# Patient Record
Sex: MALE | Race: WHITE | NOT HISPANIC OR LATINO | Employment: OTHER | URBAN - METROPOLITAN AREA
[De-identification: names, ages, dates, MRNs, and addresses within clinical notes are randomized per-mention and may not be internally consistent; named-entity substitution may affect disease eponyms.]

---

## 2024-08-09 ENCOUNTER — LAB (OUTPATIENT)
Dept: LAB | Facility: HOSPITAL | Age: 84
End: 2024-08-09
Payer: MEDICAID

## 2024-08-09 ENCOUNTER — OFFICE VISIT (OUTPATIENT)
Dept: FAMILY MEDICINE CLINIC | Facility: CLINIC | Age: 84
End: 2024-08-09
Payer: MEDICAID

## 2024-08-09 VITALS
BODY MASS INDEX: 25.29 KG/M2 | WEIGHT: 151.8 LBS | HEART RATE: 70 BPM | TEMPERATURE: 97.9 F | OXYGEN SATURATION: 92 % | HEIGHT: 65 IN | DIASTOLIC BLOOD PRESSURE: 74 MMHG | SYSTOLIC BLOOD PRESSURE: 146 MMHG

## 2024-08-09 DIAGNOSIS — I10 HYPERTENSION, UNSPECIFIED TYPE: ICD-10-CM

## 2024-08-09 DIAGNOSIS — R35.0 URINARY FREQUENCY: ICD-10-CM

## 2024-08-09 DIAGNOSIS — D64.9 ANEMIA, UNSPECIFIED TYPE: ICD-10-CM

## 2024-08-09 DIAGNOSIS — D64.9 ANEMIA, UNSPECIFIED TYPE: Primary | ICD-10-CM

## 2024-08-09 DIAGNOSIS — K21.9 GASTROESOPHAGEAL REFLUX DISEASE, UNSPECIFIED WHETHER ESOPHAGITIS PRESENT: ICD-10-CM

## 2024-08-09 DIAGNOSIS — E78.5 HYPERLIPIDEMIA, UNSPECIFIED HYPERLIPIDEMIA TYPE: ICD-10-CM

## 2024-08-09 LAB
ALBUMIN SERPL-MCNC: 4.2 G/DL (ref 3.5–5.2)
ALBUMIN/GLOB SERPL: 1.4 G/DL
ALP SERPL-CCNC: 74 U/L (ref 39–117)
ALT SERPL W P-5'-P-CCNC: 20 U/L (ref 1–41)
ANION GAP SERPL CALCULATED.3IONS-SCNC: 11.6 MMOL/L (ref 5–15)
AST SERPL-CCNC: 23 U/L (ref 1–40)
BASOPHILS # BLD AUTO: 0.19 10*3/MM3 (ref 0–0.2)
BASOPHILS NFR BLD AUTO: 1.9 % (ref 0–1.5)
BILIRUB SERPL-MCNC: 0.3 MG/DL (ref 0–1.2)
BILIRUB UR QL STRIP: NEGATIVE
BUN SERPL-MCNC: 25 MG/DL (ref 8–23)
BUN/CREAT SERPL: 16.8 (ref 7–25)
CALCIUM SPEC-SCNC: 10 MG/DL (ref 8.6–10.5)
CHLORIDE SERPL-SCNC: 106 MMOL/L (ref 98–107)
CHOLEST SERPL-MCNC: 160 MG/DL (ref 0–200)
CLARITY UR: CLEAR
CO2 SERPL-SCNC: 22.4 MMOL/L (ref 22–29)
COLOR UR: YELLOW
CREAT SERPL-MCNC: 1.49 MG/DL (ref 0.76–1.27)
DEPRECATED RDW RBC AUTO: 57.4 FL (ref 37–54)
EGFRCR SERPLBLD CKD-EPI 2021: 46.3 ML/MIN/1.73
EOSINOPHIL # BLD AUTO: 0.3 10*3/MM3 (ref 0–0.4)
EOSINOPHIL NFR BLD AUTO: 2.9 % (ref 0.3–6.2)
ERYTHROCYTE [DISTWIDTH] IN BLOOD BY AUTOMATED COUNT: 21.1 % (ref 12.3–15.4)
GLOBULIN UR ELPH-MCNC: 3 GM/DL
GLUCOSE SERPL-MCNC: 89 MG/DL (ref 65–99)
GLUCOSE UR STRIP-MCNC: NEGATIVE MG/DL
HCT VFR BLD AUTO: 34.1 % (ref 37.5–51)
HDLC SERPL-MCNC: 35 MG/DL (ref 40–60)
HGB BLD-MCNC: 10.7 G/DL (ref 13–17.7)
HGB UR QL STRIP.AUTO: NEGATIVE
HOLD SPECIMEN: NORMAL
IMM GRANULOCYTES # BLD AUTO: 0.12 10*3/MM3 (ref 0–0.05)
IMM GRANULOCYTES NFR BLD AUTO: 1.2 % (ref 0–0.5)
KETONES UR QL STRIP: ABNORMAL
LDLC SERPL CALC-MCNC: 115 MG/DL (ref 0–100)
LDLC/HDLC SERPL: 3.3 {RATIO}
LEUKOCYTE ESTERASE UR QL STRIP.AUTO: NEGATIVE
LYMPHOCYTES # BLD AUTO: 1.33 10*3/MM3 (ref 0.7–3.1)
LYMPHOCYTES NFR BLD AUTO: 13 % (ref 19.6–45.3)
MAGNESIUM SERPL-MCNC: 2.2 MG/DL (ref 1.6–2.4)
MCH RBC QN AUTO: 24.4 PG (ref 26.6–33)
MCHC RBC AUTO-ENTMCNC: 31.4 G/DL (ref 31.5–35.7)
MCV RBC AUTO: 77.9 FL (ref 79–97)
MONOCYTES # BLD AUTO: 1.01 10*3/MM3 (ref 0.1–0.9)
MONOCYTES NFR BLD AUTO: 9.9 % (ref 5–12)
NEUTROPHILS NFR BLD AUTO: 7.26 10*3/MM3 (ref 1.7–7)
NEUTROPHILS NFR BLD AUTO: 71.1 % (ref 42.7–76)
NITRITE UR QL STRIP: NEGATIVE
NRBC BLD AUTO-RTO: 0 /100 WBC (ref 0–0.2)
PH UR STRIP.AUTO: 5.5 [PH] (ref 5–8)
PLATELET # BLD AUTO: 813 10*3/MM3 (ref 140–450)
PMV BLD AUTO: 9.4 FL (ref 6–12)
POTASSIUM SERPL-SCNC: 5.1 MMOL/L (ref 3.5–5.2)
PROT SERPL-MCNC: 7.2 G/DL (ref 6–8.5)
PROT UR QL STRIP: ABNORMAL
RBC # BLD AUTO: 4.38 10*6/MM3 (ref 4.14–5.8)
SODIUM SERPL-SCNC: 140 MMOL/L (ref 136–145)
SP GR UR STRIP: 1.02 (ref 1–1.03)
TRIGL SERPL-MCNC: 48 MG/DL (ref 0–150)
UROBILINOGEN UR QL STRIP: ABNORMAL
VIT B12 BLD-MCNC: 1202 PG/ML (ref 211–946)
VLDLC SERPL-MCNC: 10 MG/DL (ref 5–40)
WBC NRBC COR # BLD AUTO: 10.21 10*3/MM3 (ref 3.4–10.8)

## 2024-08-09 PROCEDURE — 1126F AMNT PAIN NOTED NONE PRSNT: CPT | Performed by: STUDENT IN AN ORGANIZED HEALTH CARE EDUCATION/TRAINING PROGRAM

## 2024-08-09 PROCEDURE — 83735 ASSAY OF MAGNESIUM: CPT

## 2024-08-09 PROCEDURE — 80061 LIPID PANEL: CPT

## 2024-08-09 PROCEDURE — 99204 OFFICE O/P NEW MOD 45 MIN: CPT | Performed by: STUDENT IN AN ORGANIZED HEALTH CARE EDUCATION/TRAINING PROGRAM

## 2024-08-09 PROCEDURE — 3078F DIAST BP <80 MM HG: CPT | Performed by: STUDENT IN AN ORGANIZED HEALTH CARE EDUCATION/TRAINING PROGRAM

## 2024-08-09 PROCEDURE — 82607 VITAMIN B-12: CPT

## 2024-08-09 PROCEDURE — 1159F MED LIST DOCD IN RCRD: CPT | Performed by: STUDENT IN AN ORGANIZED HEALTH CARE EDUCATION/TRAINING PROGRAM

## 2024-08-09 PROCEDURE — 85025 COMPLETE CBC W/AUTO DIFF WBC: CPT

## 2024-08-09 PROCEDURE — 80053 COMPREHEN METABOLIC PANEL: CPT

## 2024-08-09 PROCEDURE — 1160F RVW MEDS BY RX/DR IN RCRD: CPT | Performed by: STUDENT IN AN ORGANIZED HEALTH CARE EDUCATION/TRAINING PROGRAM

## 2024-08-09 PROCEDURE — 36415 COLL VENOUS BLD VENIPUNCTURE: CPT

## 2024-08-09 PROCEDURE — 81003 URINALYSIS AUTO W/O SCOPE: CPT

## 2024-08-09 PROCEDURE — 3077F SYST BP >= 140 MM HG: CPT | Performed by: STUDENT IN AN ORGANIZED HEALTH CARE EDUCATION/TRAINING PROGRAM

## 2024-08-09 RX ORDER — ALUMINUM HYDROXIDE 320 MG/5ML
5 LIQUID ORAL
COMMUNITY
End: 2024-08-09

## 2024-08-09 RX ORDER — OMEPRAZOLE 20 MG/1
CAPSULE, DELAYED RELEASE ORAL
COMMUNITY
Start: 2024-07-10 | End: 2024-08-09 | Stop reason: SDUPTHER

## 2024-08-09 RX ORDER — LISINOPRIL 10 MG/1
1 TABLET ORAL DAILY
COMMUNITY
Start: 2024-05-20

## 2024-08-09 RX ORDER — OMEPRAZOLE 20 MG/1
20 CAPSULE, DELAYED RELEASE ORAL DAILY
Qty: 90 CAPSULE | Refills: 1 | Status: SHIPPED | OUTPATIENT
Start: 2024-08-09

## 2024-08-09 RX ORDER — FERROUS SULFATE 325(65) MG
1 TABLET ORAL EVERY 12 HOURS SCHEDULED
COMMUNITY
Start: 2024-06-04

## 2024-08-09 RX ORDER — DOXAZOSIN 2 MG/1
1 TABLET ORAL DAILY
COMMUNITY
Start: 2024-06-04 | End: 2024-08-09

## 2024-08-09 RX ORDER — TAMSULOSIN HYDROCHLORIDE 0.4 MG/1
1 CAPSULE ORAL DAILY
Qty: 90 CAPSULE | Refills: 1 | Status: SHIPPED | OUTPATIENT
Start: 2024-08-09

## 2024-08-09 RX ORDER — DOXAZOSIN MESYLATE 1 MG/1
1 TABLET ORAL
COMMUNITY
Start: 2024-05-30 | End: 2024-08-09

## 2024-08-09 RX ORDER — ATORVASTATIN CALCIUM 10 MG/1
10 TABLET, FILM COATED ORAL EVERY EVENING
COMMUNITY
Start: 2024-07-11

## 2024-08-09 NOTE — ASSESSMENT & PLAN NOTE
- Patient reports a history of high cholesterol, has not been taking his atorvastatin 10 mg daily because it made his head hurt  - Obtaining lipid panel today, will follow-up on results and advise further

## 2024-08-09 NOTE — ASSESSMENT & PLAN NOTE
- Patient has a diagnosis of hypertension, he has not been taking his lisinopril because his blood pressure has been normal, it is normal for age today, goal is to be less than 150/90  - I think it is reasonable to hold off on lisinopril for now and just to monitor  - Obtaining CMP today

## 2024-08-09 NOTE — PROGRESS NOTES
"    Office Note     Name: Royce Preciado    : 1940     MRN: [id number]     Chief Complaint  Establish Care    Subjective     History of Present Illness:  Royce Preciado is a 84 y.o. male who presents today to establish care and to discuss his chronic medical conditions.  Patient was previously seen at [facility].    GERD: Patient is currently on omeprazole 20 mg daily.  He reports compliance with this medicine.  He does report that he has been on it for a number of years and it does help control his symptoms.    Hypertension: Patient currently on lisinopril 10 mg, she does not take on a daily basis.  The last 1 that he took it was 2 weeks ago.  He reports that his blood pressure has been doing fine at home.  He checks it on a daily basis and it is usually in the 120s/70s SBP.  He has a list with him today.  He denies any chest pain.    HLD: Patient was previously on atorvastatin 10 mg daily but stopped taking this medicine because it made his head hurt.  When he stopped taking the medicine, has had stopped hurting.  He is okay with getting his cholesterol checked today.    Patient does have urinary frequency, usually at night.  He can get up 2-3 times to go to the bathroom.  He does feel like he is emptying completely.  He is willing to start trial on tamsulosin.    Patient was told that he has critically low hemoglobin.  He has been on iron supplementation for more than 6 weeks.  He is okay with getting his repeat CBC today.    Patient does have diarrhea every couple of days.  He does have some bloating associated with kiwi and milk use but he really likes both.            Objective     History reviewed. No pertinent past medical history.  History reviewed. No pertinent surgical history.  History reviewed. No pertinent family history.    Vital Signs  /74   Pulse 70   Temp 97.9 °F (36.6 °C) (Temporal)   Ht 165.1 cm (65\")   Wt 68.9 kg (151 lb 12.8 oz)   SpO2 92%   BMI 25.26 kg/m²   Estimated " "body mass index is 25.26 kg/m² as calculated from the following:    Height as of this encounter: 165.1 cm (65\").    Weight as of this encounter: 68.9 kg (151 lb 12.8 oz).    Physical Exam  Vitals reviewed.   Constitutional:       Appearance: Normal appearance.   HENT:      Head: Normocephalic.      Right Ear: Tympanic membrane normal.      Left Ear: Tympanic membrane normal.      Nose: Nose normal.      Mouth/Throat:      Mouth: Mucous membranes are moist.   Eyes:      Conjunctiva/sclera: Conjunctivae normal.   Cardiovascular:      Rate and Rhythm: Normal rate and regular rhythm.   Pulmonary:      Effort: Pulmonary effort is normal.      Breath sounds: Normal breath sounds.   Abdominal:      General: Abdomen is flat.      Palpations: Abdomen is soft.   Skin:     General: Skin is warm.   Neurological:      Mental Status: He is alert.   Psychiatric:         Mood and Affect: Mood normal.         Behavior: Behavior normal.                   Assessment and Plan     Diagnoses and all orders for this visit:    1. Anemia, unspecified type (Primary)  Assessment & Plan:  - Patient said that he has had low hemoglobin  - He has been on iron supplementation for 6 weeks  - Obtaining repeat CBC, will follow-up on results and advise further    Orders:  -     CBC & Differential; Future  -     Iron and TIBC; Future    2. Hyperlipidemia, unspecified hyperlipidemia type  Assessment & Plan:  - Patient reports a history of high cholesterol, has not been taking his atorvastatin 10 mg daily because it made his head hurt  - Obtaining lipid panel today, will follow-up on results and advise further    Orders:  -     Lipid Panel; Future    3. Hypertension, unspecified type  Assessment & Plan:  - Patient has a diagnosis of hypertension, he has not been taking his lisinopril because his blood pressure has been normal, it is normal for age today, goal is to be less than 150/90  - I think it is reasonable to hold off on lisinopril for now and just " to monitor  - Obtaining CMP today    Orders:  -     Comprehensive Metabolic Panel; Future    4. Urinary frequency  Assessment & Plan:  - Obtaining UA to rule out possible infection, follow-up on those results and advise further  - Could be secondary to BPH  - We will start tamsulosin daily    Orders:  -     Urinalysis With Culture If Indicated -; Future  -     tamsulosin (FLOMAX) 0.4 MG capsule 24 hr capsule; Take 1 capsule by mouth Daily.  Dispense: 90 capsule; Refill: 1    5. Gastroesophageal reflux disease, unspecified whether esophagitis present  Assessment & Plan:  - Controlled  - Continue omeprazole 20 mg daily  - Obtaining electrolytes, B12 and magnesium level today    Orders:  -     omeprazole (priLOSEC) 20 MG capsule; Take 1 capsule by mouth Daily.  Dispense: 90 capsule; Refill: 1  -     Magnesium; Future  -     Vitamin B12; Future        Follow Up  Return in about 6 months (around 2/9/2025) for HTN, GERD.    Marlena Mccall MD

## 2024-08-09 NOTE — ASSESSMENT & PLAN NOTE
- Obtaining UA to rule out possible infection, follow-up on those results and advise further  - Could be secondary to BPH  - We will start tamsulosin daily

## 2024-08-09 NOTE — ASSESSMENT & PLAN NOTE
- Patient said that he has had low hemoglobin  - He has been on iron supplementation for 6 weeks  - Obtaining repeat CBC, will follow-up on results and advise further

## 2024-08-09 NOTE — ASSESSMENT & PLAN NOTE
- Controlled  - Continue omeprazole 20 mg daily  - Obtaining electrolytes, B12 and magnesium level today

## 2024-08-20 ENCOUNTER — TELEPHONE (OUTPATIENT)
Dept: FAMILY MEDICINE CLINIC | Facility: CLINIC | Age: 84
End: 2024-08-20
Payer: MEDICAID

## 2024-08-20 DIAGNOSIS — E78.5 HYPERLIPIDEMIA, UNSPECIFIED HYPERLIPIDEMIA TYPE: ICD-10-CM

## 2024-08-20 RX ORDER — FERROUS SULFATE 325(65) MG
1 TABLET ORAL
Qty: 30 TABLET | Refills: 1 | Status: SHIPPED | OUTPATIENT
Start: 2024-08-20

## 2024-08-20 NOTE — TELEPHONE ENCOUNTER
Can someone please call [facility] in [place] and ask for his most recent several office notes and labs?  I need those records ASAP.

## 2024-09-10 ENCOUNTER — OFFICE VISIT (OUTPATIENT)
Dept: FAMILY MEDICINE CLINIC | Facility: CLINIC | Age: 84
End: 2024-09-10
Payer: MEDICAID

## 2024-09-10 ENCOUNTER — LAB (OUTPATIENT)
Dept: LAB | Facility: HOSPITAL | Age: 84
End: 2024-09-10
Payer: MEDICAID

## 2024-09-10 VITALS
BODY MASS INDEX: 25.59 KG/M2 | TEMPERATURE: 97.8 F | OXYGEN SATURATION: 95 % | HEIGHT: 65 IN | DIASTOLIC BLOOD PRESSURE: 76 MMHG | WEIGHT: 153.6 LBS | HEART RATE: 76 BPM | SYSTOLIC BLOOD PRESSURE: 130 MMHG

## 2024-09-10 DIAGNOSIS — R79.89 ELEVATED SERUM CREATININE: ICD-10-CM

## 2024-09-10 DIAGNOSIS — I10 HYPERTENSION, UNSPECIFIED TYPE: ICD-10-CM

## 2024-09-10 DIAGNOSIS — D64.9 ANEMIA, UNSPECIFIED TYPE: ICD-10-CM

## 2024-09-10 DIAGNOSIS — D64.9 ANEMIA, UNSPECIFIED TYPE: Primary | ICD-10-CM

## 2024-09-10 LAB
ALBUMIN SERPL-MCNC: 4.3 G/DL (ref 3.5–5.2)
ALBUMIN/GLOB SERPL: 1.7 G/DL
ALP SERPL-CCNC: 65 U/L (ref 39–117)
ALT SERPL W P-5'-P-CCNC: 17 U/L (ref 1–41)
ANION GAP SERPL CALCULATED.3IONS-SCNC: 8.6 MMOL/L (ref 5–15)
AST SERPL-CCNC: 24 U/L (ref 1–40)
BASOPHILS # BLD AUTO: 0.17 10*3/MM3 (ref 0–0.2)
BASOPHILS NFR BLD AUTO: 2.1 % (ref 0–1.5)
BILIRUB SERPL-MCNC: 0.3 MG/DL (ref 0–1.2)
BUN SERPL-MCNC: 22 MG/DL (ref 8–23)
BUN/CREAT SERPL: 16.7 (ref 7–25)
CALCIUM SPEC-SCNC: 9.7 MG/DL (ref 8.6–10.5)
CHLORIDE SERPL-SCNC: 109 MMOL/L (ref 98–107)
CO2 SERPL-SCNC: 20.4 MMOL/L (ref 22–29)
CREAT SERPL-MCNC: 1.32 MG/DL (ref 0.76–1.27)
DEPRECATED RDW RBC AUTO: 54.5 FL (ref 37–54)
EGFRCR SERPLBLD CKD-EPI 2021: 53.5 ML/MIN/1.73
EOSINOPHIL # BLD AUTO: 0.26 10*3/MM3 (ref 0–0.4)
EOSINOPHIL NFR BLD AUTO: 3.2 % (ref 0.3–6.2)
ERYTHROCYTE [DISTWIDTH] IN BLOOD BY AUTOMATED COUNT: 19.3 % (ref 12.3–15.4)
FERRITIN SERPL-MCNC: 50.2 NG/ML (ref 30–400)
GLOBULIN UR ELPH-MCNC: 2.6 GM/DL
GLUCOSE SERPL-MCNC: 93 MG/DL (ref 65–99)
HCT VFR BLD AUTO: 34.1 % (ref 37.5–51)
HGB BLD-MCNC: 10.8 G/DL (ref 13–17.7)
IMM GRANULOCYTES # BLD AUTO: 0.12 10*3/MM3 (ref 0–0.05)
IMM GRANULOCYTES NFR BLD AUTO: 1.5 % (ref 0–0.5)
IRON 24H UR-MRATE: 47 MCG/DL (ref 59–158)
IRON SATN MFR SERPL: 12 % (ref 20–50)
LYMPHOCYTES # BLD AUTO: 1.13 10*3/MM3 (ref 0.7–3.1)
LYMPHOCYTES NFR BLD AUTO: 13.7 % (ref 19.6–45.3)
MCH RBC QN AUTO: 25.4 PG (ref 26.6–33)
MCHC RBC AUTO-ENTMCNC: 31.7 G/DL (ref 31.5–35.7)
MCV RBC AUTO: 80.2 FL (ref 79–97)
MONOCYTES # BLD AUTO: 0.88 10*3/MM3 (ref 0.1–0.9)
MONOCYTES NFR BLD AUTO: 10.7 % (ref 5–12)
NEUTROPHILS NFR BLD AUTO: 5.66 10*3/MM3 (ref 1.7–7)
NEUTROPHILS NFR BLD AUTO: 68.8 % (ref 42.7–76)
NRBC BLD AUTO-RTO: 0.4 /100 WBC (ref 0–0.2)
PLATELET # BLD AUTO: 775 10*3/MM3 (ref 140–450)
PMV BLD AUTO: 9.2 FL (ref 6–12)
POTASSIUM SERPL-SCNC: 4.8 MMOL/L (ref 3.5–5.2)
PROT SERPL-MCNC: 6.9 G/DL (ref 6–8.5)
RBC # BLD AUTO: 4.25 10*6/MM3 (ref 4.14–5.8)
SODIUM SERPL-SCNC: 138 MMOL/L (ref 136–145)
TIBC SERPL-MCNC: 407 MCG/DL (ref 298–536)
TRANSFERRIN SERPL-MCNC: 273 MG/DL (ref 200–360)
WBC NRBC COR # BLD AUTO: 8.22 10*3/MM3 (ref 3.4–10.8)

## 2024-09-10 PROCEDURE — 99214 OFFICE O/P EST MOD 30 MIN: CPT | Performed by: STUDENT IN AN ORGANIZED HEALTH CARE EDUCATION/TRAINING PROGRAM

## 2024-09-10 PROCEDURE — 1159F MED LIST DOCD IN RCRD: CPT | Performed by: STUDENT IN AN ORGANIZED HEALTH CARE EDUCATION/TRAINING PROGRAM

## 2024-09-10 PROCEDURE — 82728 ASSAY OF FERRITIN: CPT

## 2024-09-10 PROCEDURE — 1160F RVW MEDS BY RX/DR IN RCRD: CPT | Performed by: STUDENT IN AN ORGANIZED HEALTH CARE EDUCATION/TRAINING PROGRAM

## 2024-09-10 PROCEDURE — 84466 ASSAY OF TRANSFERRIN: CPT

## 2024-09-10 PROCEDURE — 36415 COLL VENOUS BLD VENIPUNCTURE: CPT

## 2024-09-10 PROCEDURE — 1126F AMNT PAIN NOTED NONE PRSNT: CPT | Performed by: STUDENT IN AN ORGANIZED HEALTH CARE EDUCATION/TRAINING PROGRAM

## 2024-09-10 PROCEDURE — 80053 COMPREHEN METABOLIC PANEL: CPT

## 2024-09-10 PROCEDURE — 85025 COMPLETE CBC W/AUTO DIFF WBC: CPT

## 2024-09-10 PROCEDURE — 83540 ASSAY OF IRON: CPT

## 2024-09-10 PROCEDURE — 3078F DIAST BP <80 MM HG: CPT | Performed by: STUDENT IN AN ORGANIZED HEALTH CARE EDUCATION/TRAINING PROGRAM

## 2024-09-10 PROCEDURE — 3075F SYST BP GE 130 - 139MM HG: CPT | Performed by: STUDENT IN AN ORGANIZED HEALTH CARE EDUCATION/TRAINING PROGRAM

## 2024-09-10 NOTE — ASSESSMENT & PLAN NOTE
- Patient with elevated serum creatinine on most recent labs, unknown baseline  - Suspect this is secondary to dehydration and possibly some diarrhea  - Obtaining repeat CMP, will follow-up on results and advise further

## 2024-09-10 NOTE — ASSESSMENT & PLAN NOTE
- Patient with low hemoglobin and elevated platelets on most recent labs  - He is currently on iron supplementation and does take it on a regular basis  - Obtaining repeat CBC and iron studies today  - Discussed GI referral and hematology referral and patient declines any further intervention at this time and wants to live life without any invasive testing or additional doctors visits

## 2024-09-10 NOTE — PROGRESS NOTES
"    Office Note     Name: Royce Preciado    : 1940     MRN: [id number]     Chief Complaint  Hypertension, Hyperlipidemia, and Anemia    Subjective     History of Present Illness:  Royce Preciado is a 84 y.o. male who presents today for anemia and elevated creatinine.    We discussed the patient's anemia at length today and the need for hematology referral and GI referral.  Patient reports that he does not have diarrhea all the time and he does not want to get checked for colon cancer.  He is taking his iron supplements.  He would like to get his blood rechecked today.  He does not want anything invasive and wants to continue living his life for the duration of time that God has pretty destined for him.    Creatinine was elevated on his most recent labs.  He does drink mostly tea but not very much water.    Hypertension: Patient had been on lisinopril in the past.  He is not currently taking this medicine and his blood pressures are within normal limits at home and here.          Objective     History reviewed. No pertinent past medical history.  History reviewed. No pertinent surgical history.  History reviewed. No pertinent family history.    Vital Signs  /76   Pulse 76   Temp 97.8 °F (36.6 °C) (Infrared)   Ht 165.1 cm (65\")   Wt 69.7 kg (153 lb 9.6 oz)   SpO2 95%   BMI 25.56 kg/m²   Estimated body mass index is 25.56 kg/m² as calculated from the following:    Height as of this encounter: 165.1 cm (65\").    Weight as of this encounter: 69.7 kg (153 lb 9.6 oz).    Physical Exam  Vitals reviewed.   Constitutional:       Appearance: Normal appearance.   HENT:      Head: Normocephalic.   Cardiovascular:      Rate and Rhythm: Normal rate and regular rhythm.   Pulmonary:      Effort: Pulmonary effort is normal.      Breath sounds: Normal breath sounds.   Neurological:      Mental Status: He is alert.               Assessment and Plan     Diagnoses and all orders for this visit:    1. Anemia, " unspecified type (Primary)  Assessment & Plan:  - Patient with low hemoglobin and elevated platelets on most recent labs  - He is currently on iron supplementation and does take it on a regular basis  - Obtaining repeat CBC and iron studies today  - Discussed GI referral and hematology referral and patient declines any further intervention at this time and wants to live life without any invasive testing or additional doctors visits    Orders:  -     CBC & Differential; Future  -     Iron and TIBC; Future  -     Ferritin; Future    2. Elevated serum creatinine  Assessment & Plan:  - Patient with elevated serum creatinine on most recent labs, unknown baseline  - Suspect this is secondary to dehydration and possibly some diarrhea  - Obtaining repeat CMP, will follow-up on results and advise further    Orders:  -     Comprehensive Metabolic Panel; Future    3. Hypertension, unspecified type  Assessment & Plan:  - Patient has a diagnosis of hypertension, he has not been taking his lisinopril because his blood pressure has been normal at home and is normal today in clinic  - I think it is reasonable to hold off on lisinopril for now and just to monitor           Follow Up  No follow-ups on file.    Marlena Mccall MD

## 2024-09-10 NOTE — ASSESSMENT & PLAN NOTE
- Patient has a diagnosis of hypertension, he has not been taking his lisinopril because his blood pressure has been normal at home and is normal today in clinic  - I think it is reasonable to hold off on lisinopril for now and just to monitor

## 2024-09-11 DIAGNOSIS — E78.5 HYPERLIPIDEMIA, UNSPECIFIED HYPERLIPIDEMIA TYPE: ICD-10-CM

## 2024-09-11 RX ORDER — FERROUS SULFATE 325(65) MG
1 TABLET ORAL
Qty: 90 TABLET | Refills: 1 | Status: SHIPPED | OUTPATIENT
Start: 2024-09-11

## 2024-11-06 DIAGNOSIS — R35.0 URINARY FREQUENCY: ICD-10-CM

## 2024-11-06 RX ORDER — TAMSULOSIN HYDROCHLORIDE 0.4 MG/1
1 CAPSULE ORAL DAILY
Qty: 90 CAPSULE | Refills: 0 | Status: SHIPPED | OUTPATIENT
Start: 2024-11-06 | End: 2024-11-07 | Stop reason: SDUPTHER

## 2024-11-07 DIAGNOSIS — R35.0 URINARY FREQUENCY: ICD-10-CM

## 2024-11-07 RX ORDER — TAMSULOSIN HYDROCHLORIDE 0.4 MG/1
1 CAPSULE ORAL DAILY
Qty: 90 CAPSULE | Refills: 1 | Status: SHIPPED | OUTPATIENT
Start: 2024-11-07

## 2025-02-10 ENCOUNTER — OFFICE VISIT (OUTPATIENT)
Dept: FAMILY MEDICINE CLINIC | Facility: CLINIC | Age: 85
End: 2025-02-10
Payer: MEDICARE

## 2025-02-10 ENCOUNTER — LAB (OUTPATIENT)
Dept: LAB | Facility: HOSPITAL | Age: 85
End: 2025-02-10
Payer: MEDICARE

## 2025-02-10 VITALS
SYSTOLIC BLOOD PRESSURE: 140 MMHG | WEIGHT: 155 LBS | HEART RATE: 80 BPM | HEIGHT: 65 IN | DIASTOLIC BLOOD PRESSURE: 72 MMHG | OXYGEN SATURATION: 98 % | TEMPERATURE: 98.2 F | BODY MASS INDEX: 25.83 KG/M2

## 2025-02-10 DIAGNOSIS — K21.9 GASTROESOPHAGEAL REFLUX DISEASE, UNSPECIFIED WHETHER ESOPHAGITIS PRESENT: ICD-10-CM

## 2025-02-10 DIAGNOSIS — I10 HYPERTENSION, UNSPECIFIED TYPE: Primary | ICD-10-CM

## 2025-02-10 DIAGNOSIS — D64.9 ANEMIA, UNSPECIFIED TYPE: ICD-10-CM

## 2025-02-10 DIAGNOSIS — I10 HYPERTENSION, UNSPECIFIED TYPE: ICD-10-CM

## 2025-02-10 LAB
ALBUMIN SERPL-MCNC: 4.1 G/DL (ref 3.5–5.2)
ALBUMIN/GLOB SERPL: 1.4 G/DL
ALP SERPL-CCNC: 69 U/L (ref 39–117)
ALT SERPL W P-5'-P-CCNC: 13 U/L (ref 1–41)
ANION GAP SERPL CALCULATED.3IONS-SCNC: 10 MMOL/L (ref 5–15)
AST SERPL-CCNC: 29 U/L (ref 1–40)
BASOPHILS # BLD AUTO: 0.2 10*3/MM3 (ref 0–0.2)
BASOPHILS NFR BLD AUTO: 2.1 % (ref 0–1.5)
BILIRUB SERPL-MCNC: 0.4 MG/DL (ref 0–1.2)
BUN SERPL-MCNC: 28 MG/DL (ref 8–23)
BUN/CREAT SERPL: 22.2 (ref 7–25)
CALCIUM SPEC-SCNC: 9.7 MG/DL (ref 8.6–10.5)
CHLORIDE SERPL-SCNC: 106 MMOL/L (ref 98–107)
CO2 SERPL-SCNC: 22 MMOL/L (ref 22–29)
CREAT SERPL-MCNC: 1.26 MG/DL (ref 0.76–1.27)
DEPRECATED RDW RBC AUTO: 47.7 FL (ref 37–54)
EGFRCR SERPLBLD CKD-EPI 2021: 56.6 ML/MIN/1.73
EOSINOPHIL # BLD AUTO: 0.24 10*3/MM3 (ref 0–0.4)
EOSINOPHIL NFR BLD AUTO: 2.6 % (ref 0.3–6.2)
ERYTHROCYTE [DISTWIDTH] IN BLOOD BY AUTOMATED COUNT: 16.2 % (ref 12.3–15.4)
GLOBULIN UR ELPH-MCNC: 3 GM/DL
GLUCOSE SERPL-MCNC: 83 MG/DL (ref 65–99)
HCT VFR BLD AUTO: 35.6 % (ref 37.5–51)
HGB BLD-MCNC: 11.7 G/DL (ref 13–17.7)
IMM GRANULOCYTES # BLD AUTO: 0.16 10*3/MM3 (ref 0–0.05)
IMM GRANULOCYTES NFR BLD AUTO: 1.7 % (ref 0–0.5)
IRON 24H UR-MRATE: 79 MCG/DL (ref 59–158)
IRON SATN MFR SERPL: 20 % (ref 20–50)
LYMPHOCYTES # BLD AUTO: 1.52 10*3/MM3 (ref 0.7–3.1)
LYMPHOCYTES NFR BLD AUTO: 16.3 % (ref 19.6–45.3)
MCH RBC QN AUTO: 26.8 PG (ref 26.6–33)
MCHC RBC AUTO-ENTMCNC: 32.9 G/DL (ref 31.5–35.7)
MCV RBC AUTO: 81.5 FL (ref 79–97)
MONOCYTES # BLD AUTO: 1.08 10*3/MM3 (ref 0.1–0.9)
MONOCYTES NFR BLD AUTO: 11.6 % (ref 5–12)
NEUTROPHILS NFR BLD AUTO: 6.15 10*3/MM3 (ref 1.7–7)
NEUTROPHILS NFR BLD AUTO: 65.7 % (ref 42.7–76)
NRBC BLD AUTO-RTO: 0.3 /100 WBC (ref 0–0.2)
PLATELET # BLD AUTO: 642 10*3/MM3 (ref 140–450)
PMV BLD AUTO: 9.4 FL (ref 6–12)
POTASSIUM SERPL-SCNC: 4.8 MMOL/L (ref 3.5–5.2)
PROT SERPL-MCNC: 7.1 G/DL (ref 6–8.5)
RBC # BLD AUTO: 4.37 10*6/MM3 (ref 4.14–5.8)
SODIUM SERPL-SCNC: 138 MMOL/L (ref 136–145)
TIBC SERPL-MCNC: 398 MCG/DL (ref 298–536)
TRANSFERRIN SERPL-MCNC: 267 MG/DL (ref 200–360)
WBC NRBC COR # BLD AUTO: 9.35 10*3/MM3 (ref 3.4–10.8)

## 2025-02-10 PROCEDURE — 1160F RVW MEDS BY RX/DR IN RCRD: CPT | Performed by: STUDENT IN AN ORGANIZED HEALTH CARE EDUCATION/TRAINING PROGRAM

## 2025-02-10 PROCEDURE — 3077F SYST BP >= 140 MM HG: CPT | Performed by: STUDENT IN AN ORGANIZED HEALTH CARE EDUCATION/TRAINING PROGRAM

## 2025-02-10 PROCEDURE — 85025 COMPLETE CBC W/AUTO DIFF WBC: CPT

## 2025-02-10 PROCEDURE — 36415 COLL VENOUS BLD VENIPUNCTURE: CPT

## 2025-02-10 PROCEDURE — 3078F DIAST BP <80 MM HG: CPT | Performed by: STUDENT IN AN ORGANIZED HEALTH CARE EDUCATION/TRAINING PROGRAM

## 2025-02-10 PROCEDURE — 1159F MED LIST DOCD IN RCRD: CPT | Performed by: STUDENT IN AN ORGANIZED HEALTH CARE EDUCATION/TRAINING PROGRAM

## 2025-02-10 PROCEDURE — 83540 ASSAY OF IRON: CPT

## 2025-02-10 PROCEDURE — 99214 OFFICE O/P EST MOD 30 MIN: CPT | Performed by: STUDENT IN AN ORGANIZED HEALTH CARE EDUCATION/TRAINING PROGRAM

## 2025-02-10 PROCEDURE — 84466 ASSAY OF TRANSFERRIN: CPT

## 2025-02-10 PROCEDURE — 80053 COMPREHEN METABOLIC PANEL: CPT

## 2025-02-10 PROCEDURE — 1126F AMNT PAIN NOTED NONE PRSNT: CPT | Performed by: STUDENT IN AN ORGANIZED HEALTH CARE EDUCATION/TRAINING PROGRAM

## 2025-02-10 NOTE — PROGRESS NOTES
"    Office Note     Name: Royce Preciado    : 1940     MRN: [id number]     Chief Complaint  Hypertension and Heartburn    Subjective     History of Present Illness:  Royce Preciado is a 84 y.o. male who presents today for hypertension and heartburn.    Hypertension: Patient is currently on lisinopril.  He only takes it a few days when it is high.  It is within normal limits for his age today.  He only ends up taking it twice a month when he gets stressed out.  Does not want to take it on a daily basis because his blood pressure runs good at home.    GERD: Patient is currently on omeprazole 20 mg daily.  He reports compliance with this medicine.  It does help with his symptoms.    Anemia: Patient is currently on iron supplementation.  He does not take it on a daily basis.  He is okay with getting some blood work done today.              Objective     History reviewed. No pertinent past medical history.  History reviewed. No pertinent surgical history.  History reviewed. No pertinent family history.    Vital Signs  /72   Pulse 80   Temp 98.2 °F (36.8 °C) (Infrared)   Ht 165.1 cm (65\")   Wt 70.3 kg (155 lb)   SpO2 98%   BMI 25.79 kg/m²   Estimated body mass index is 25.79 kg/m² as calculated from the following:    Height as of this encounter: 165.1 cm (65\").    Weight as of this encounter: 70.3 kg (155 lb).    Physical Exam  Vitals reviewed.   Constitutional:       Appearance: Normal appearance.   HENT:      Head: Normocephalic.   Cardiovascular:      Rate and Rhythm: Normal rate and regular rhythm.   Pulmonary:      Effort: Pulmonary effort is normal.      Breath sounds: Normal breath sounds.   Neurological:      Mental Status: He is alert.               Assessment and Plan     Diagnoses and all orders for this visit:    1. Hypertension, unspecified type (Primary)  Assessment & Plan:  - Patient has a diagnosis of hypertension, he has not been taking his lisinopril because his blood pressure has " been normal at home and is normal today in clinic for age  - I think it is reasonable to hold off on lisinopril for now and just to monitor    Orders:  -     Comprehensive Metabolic Panel; Future    2. Gastroesophageal reflux disease, unspecified whether esophagitis present  Assessment & Plan:  - Controlled  - Continue omeprazole 20 mg daily  - Obtaining electrolytes    Orders:  -     CBC & Differential; Future  -     omeprazole (priLOSEC) 20 MG capsule; Take 1 capsule by mouth Daily.  Dispense: 90 capsule; Refill: 1    3. Anemia, unspecified type  Assessment & Plan:  - Patient with low hemoglobin and elevated platelets on most recent labs, rechecking today  - He is currently on iron supplementation and but does not take it on a regular basis  - Discussed GI referral and hematology referral and patient declines any further intervention at this time and wants to live life without any invasive testing or additional doctors visits  -Repeating CBC today, will follow-up on results and advise further           Follow Up  Return in about 6 months (around 8/10/2025) for Annual.    Marlena Mccall MD

## 2025-02-10 NOTE — ASSESSMENT & PLAN NOTE
- Patient has a diagnosis of hypertension, he has not been taking his lisinopril because his blood pressure has been normal at home and is normal today in clinic for age  - I think it is reasonable to hold off on lisinopril for now and just to monitor

## 2025-02-10 NOTE — ASSESSMENT & PLAN NOTE
- Patient with low hemoglobin and elevated platelets on most recent labs, rechecking today  - He is currently on iron supplementation and but does not take it on a regular basis  - Discussed GI referral and hematology referral and patient declines any further intervention at this time and wants to live life without any invasive testing or additional doctors visits  -Repeating CBC today, will follow-up on results and advise further

## 2025-02-11 DIAGNOSIS — E78.5 HYPERLIPIDEMIA, UNSPECIFIED HYPERLIPIDEMIA TYPE: ICD-10-CM

## 2025-02-11 RX ORDER — FERROUS SULFATE 325(65) MG
1 TABLET ORAL
Qty: 90 TABLET | Refills: 1 | Status: SHIPPED | OUTPATIENT
Start: 2025-02-11

## 2025-03-14 DIAGNOSIS — R35.0 URINARY FREQUENCY: ICD-10-CM

## 2025-03-14 RX ORDER — TAMSULOSIN HYDROCHLORIDE 0.4 MG/1
1 CAPSULE ORAL DAILY
Qty: 90 CAPSULE | Refills: 1 | Status: SHIPPED | OUTPATIENT
Start: 2025-03-14

## 2025-05-07 ENCOUNTER — OFFICE VISIT (OUTPATIENT)
Dept: FAMILY MEDICINE CLINIC | Facility: CLINIC | Age: 85
End: 2025-05-07
Payer: MEDICARE

## 2025-05-07 VITALS
BODY MASS INDEX: 25.26 KG/M2 | HEIGHT: 65 IN | WEIGHT: 151.6 LBS | SYSTOLIC BLOOD PRESSURE: 120 MMHG | HEART RATE: 68 BPM | OXYGEN SATURATION: 100 % | TEMPERATURE: 98.3 F | DIASTOLIC BLOOD PRESSURE: 82 MMHG

## 2025-05-07 DIAGNOSIS — D64.9 ANEMIA, UNSPECIFIED TYPE: ICD-10-CM

## 2025-05-07 DIAGNOSIS — H91.93 BILATERAL HEARING LOSS, UNSPECIFIED HEARING LOSS TYPE: Primary | ICD-10-CM

## 2025-05-07 PROCEDURE — 3074F SYST BP LT 130 MM HG: CPT | Performed by: STUDENT IN AN ORGANIZED HEALTH CARE EDUCATION/TRAINING PROGRAM

## 2025-05-07 PROCEDURE — 1126F AMNT PAIN NOTED NONE PRSNT: CPT | Performed by: STUDENT IN AN ORGANIZED HEALTH CARE EDUCATION/TRAINING PROGRAM

## 2025-05-07 PROCEDURE — 1160F RVW MEDS BY RX/DR IN RCRD: CPT | Performed by: STUDENT IN AN ORGANIZED HEALTH CARE EDUCATION/TRAINING PROGRAM

## 2025-05-07 PROCEDURE — 3079F DIAST BP 80-89 MM HG: CPT | Performed by: STUDENT IN AN ORGANIZED HEALTH CARE EDUCATION/TRAINING PROGRAM

## 2025-05-07 PROCEDURE — 99214 OFFICE O/P EST MOD 30 MIN: CPT | Performed by: STUDENT IN AN ORGANIZED HEALTH CARE EDUCATION/TRAINING PROGRAM

## 2025-05-07 PROCEDURE — 1159F MED LIST DOCD IN RCRD: CPT | Performed by: STUDENT IN AN ORGANIZED HEALTH CARE EDUCATION/TRAINING PROGRAM

## 2025-05-07 RX ORDER — FERROUS SULFATE 325(65) MG
1 TABLET ORAL
Qty: 90 TABLET | Refills: 1 | Status: SHIPPED | OUTPATIENT
Start: 2025-05-07

## 2025-05-07 NOTE — ASSESSMENT & PLAN NOTE
- Patient with low hemoglobin and elevated platelets  - He is currently on iron supplementation and does take it on a regular basis  - Discussed GI referral and hematology referral and patient declines any further intervention at this time and wants to live life without any invasive testing or additional doctors visits

## 2025-05-07 NOTE — PROGRESS NOTES
"    Office Note     Name: Royce Preciado    : 1940     MRN: [id number]     Chief Complaint  Hearing Loss (L hear, since around  )    Subjective     History of Present Illness:  Royce Preciado is a 84 y.o. male who presents today for hearing loss.  He reports that this has been an ongoing issue for a long time.  He is having a lot more trouble hearing at [employer].  He would like an audiology referral to discuss potentially getting hearing aids.    Patient does need a refill on his iron.          Objective     History reviewed. No pertinent past medical history.  History reviewed. No pertinent surgical history.  History reviewed. No pertinent family history.    Vital Signs  /82   Pulse 68   Temp 98.3 °F (36.8 °C) (Infrared)   Ht 165.1 cm (65\")   Wt 68.8 kg (151 lb 9.6 oz)   SpO2 100%   BMI 25.23 kg/m²   Estimated body mass index is 25.23 kg/m² as calculated from the following:    Height as of this encounter: 165.1 cm (65\").    Weight as of this encounter: 68.8 kg (151 lb 9.6 oz).    Physical Exam  Vitals reviewed.   HENT:      Head: Normocephalic.      Right Ear: Tympanic membrane normal. There is no impacted cerumen.      Left Ear: Tympanic membrane normal. There is no impacted cerumen.      Nose: Nose normal.      Mouth/Throat:      Mouth: Mucous membranes are moist.   Cardiovascular:      Rate and Rhythm: Normal rate.   Pulmonary:      Effort: Pulmonary effort is normal.   Neurological:      Mental Status: He is alert.               Assessment and Plan     Diagnoses and all orders for this visit:    1. Bilateral hearing loss, unspecified hearing loss type (Primary)  Assessment & Plan:  - Patient unable to hear finger rub bilaterally  - Audiology referral placed    Orders:  -     Ambulatory Referral to Audiology    2. Anemia, unspecified type  Assessment & Plan:  - Patient with low hemoglobin and elevated platelets  - He is currently on iron supplementation and does take it on a regular " basis  - Discussed GI referral and hematology referral and patient declines any further intervention at this time and wants to live life without any invasive testing or additional doctors visits    Orders:  -     FeroSul 325 (65 Fe) MG tablet; Take 1 tablet by mouth Daily With Breakfast.  Dispense: 90 tablet; Refill: 1        Follow Up  No follow-ups on file.    Marlena Mccall MD

## 2025-05-20 ENCOUNTER — TELEPHONE (OUTPATIENT)
Dept: FAMILY MEDICINE CLINIC | Facility: CLINIC | Age: 85
End: 2025-05-20
Payer: MEDICARE

## 2025-05-20 NOTE — TELEPHONE ENCOUNTER
The patient's daughter-in-law is reaching out and asking about this patient's audiology referral.  Is there a way that we can expedite the referral?  And possibly provide a phone number so they can call on their own as well?   Name band;

## 2025-05-21 ENCOUNTER — TELEPHONE (OUTPATIENT)
Dept: FAMILY MEDICINE CLINIC | Facility: CLINIC | Age: 85
End: 2025-05-21
Payer: MEDICARE

## 2025-05-21 NOTE — TELEPHONE ENCOUNTER
The patient's daughter-in-law called and said that the place where we sent this patient's referral does not take his insurance.  He cannot pay out-of-pocket.  Can we please send the referral to a different location where his insurance will cover his visit?    If you would call the daughter-in-law, who speaks English and give her the phone number so that she can call and get that appointment expedited, that would be great.

## 2025-05-22 NOTE — TELEPHONE ENCOUNTER
Spoke with Jaci, pt's daughter in law, and gave her the contact information for [facility]. Referral has also been sent.

## 2025-07-22 ENCOUNTER — OFFICE VISIT (OUTPATIENT)
Dept: FAMILY MEDICINE CLINIC | Facility: CLINIC | Age: 85
End: 2025-07-22
Payer: MEDICARE

## 2025-07-22 ENCOUNTER — LAB (OUTPATIENT)
Dept: LAB | Facility: HOSPITAL | Age: 85
End: 2025-07-22
Payer: MEDICARE

## 2025-07-22 VITALS
WEIGHT: 147 LBS | HEIGHT: 65 IN | BODY MASS INDEX: 24.49 KG/M2 | HEART RATE: 78 BPM | SYSTOLIC BLOOD PRESSURE: 132 MMHG | DIASTOLIC BLOOD PRESSURE: 70 MMHG | OXYGEN SATURATION: 96 % | TEMPERATURE: 98 F

## 2025-07-22 DIAGNOSIS — R35.0 URINARY FREQUENCY: ICD-10-CM

## 2025-07-22 DIAGNOSIS — G89.29 CHRONIC MIDLINE LOW BACK PAIN, UNSPECIFIED WHETHER SCIATICA PRESENT: Primary | ICD-10-CM

## 2025-07-22 DIAGNOSIS — R10.9 ABDOMINAL PAIN, UNSPECIFIED ABDOMINAL LOCATION: ICD-10-CM

## 2025-07-22 DIAGNOSIS — M25.50 POLYARTHRALGIA: ICD-10-CM

## 2025-07-22 DIAGNOSIS — M54.50 CHRONIC MIDLINE LOW BACK PAIN, UNSPECIFIED WHETHER SCIATICA PRESENT: Primary | ICD-10-CM

## 2025-07-22 DIAGNOSIS — Z12.5 ENCOUNTER FOR PROSTATE CANCER SCREENING: ICD-10-CM

## 2025-07-22 LAB — CRP SERPL-MCNC: 1.31 MG/DL (ref 0–0.5)

## 2025-07-22 PROCEDURE — 99214 OFFICE O/P EST MOD 30 MIN: CPT | Performed by: STUDENT IN AN ORGANIZED HEALTH CARE EDUCATION/TRAINING PROGRAM

## 2025-07-22 PROCEDURE — G0103 PSA SCREENING: HCPCS | Performed by: STUDENT IN AN ORGANIZED HEALTH CARE EDUCATION/TRAINING PROGRAM

## 2025-07-22 PROCEDURE — 3075F SYST BP GE 130 - 139MM HG: CPT | Performed by: STUDENT IN AN ORGANIZED HEALTH CARE EDUCATION/TRAINING PROGRAM

## 2025-07-22 PROCEDURE — 1125F AMNT PAIN NOTED PAIN PRSNT: CPT | Performed by: STUDENT IN AN ORGANIZED HEALTH CARE EDUCATION/TRAINING PROGRAM

## 2025-07-22 PROCEDURE — 86140 C-REACTIVE PROTEIN: CPT | Performed by: STUDENT IN AN ORGANIZED HEALTH CARE EDUCATION/TRAINING PROGRAM

## 2025-07-22 PROCEDURE — 86431 RHEUMATOID FACTOR QUANT: CPT | Performed by: STUDENT IN AN ORGANIZED HEALTH CARE EDUCATION/TRAINING PROGRAM

## 2025-07-22 PROCEDURE — 3078F DIAST BP <80 MM HG: CPT | Performed by: STUDENT IN AN ORGANIZED HEALTH CARE EDUCATION/TRAINING PROGRAM

## 2025-07-22 PROCEDURE — 85025 COMPLETE CBC W/AUTO DIFF WBC: CPT | Performed by: STUDENT IN AN ORGANIZED HEALTH CARE EDUCATION/TRAINING PROGRAM

## 2025-07-22 PROCEDURE — 86038 ANTINUCLEAR ANTIBODIES: CPT | Performed by: STUDENT IN AN ORGANIZED HEALTH CARE EDUCATION/TRAINING PROGRAM

## 2025-07-22 PROCEDURE — 1159F MED LIST DOCD IN RCRD: CPT | Performed by: STUDENT IN AN ORGANIZED HEALTH CARE EDUCATION/TRAINING PROGRAM

## 2025-07-22 PROCEDURE — 36415 COLL VENOUS BLD VENIPUNCTURE: CPT | Performed by: STUDENT IN AN ORGANIZED HEALTH CARE EDUCATION/TRAINING PROGRAM

## 2025-07-22 PROCEDURE — 1160F RVW MEDS BY RX/DR IN RCRD: CPT | Performed by: STUDENT IN AN ORGANIZED HEALTH CARE EDUCATION/TRAINING PROGRAM

## 2025-07-22 PROCEDURE — 80053 COMPREHEN METABOLIC PANEL: CPT | Performed by: STUDENT IN AN ORGANIZED HEALTH CARE EDUCATION/TRAINING PROGRAM

## 2025-07-22 NOTE — PROGRESS NOTES
"    Office Note     Name: Royce Preciado    : 1940     MRN: [id number]     Chief Complaint  Pain (Joint pain. Wakes up at night due to pain.)    Subjective     History of Present Illness:  Royce Preciado is a 85 y.o. male who presents today for low back pain, pelvic pain, and joint pain.  Patient is here with his son today.    Approximately, 1 week ago, patient was having very severe back pain.  He has had associated urinary frequency.  He stopped taking the tamsulosin.  He did started up again in the last couple of days.  He reports that the back pain was so severe that it brought him to tears.  He reports that the pain is both sharp and dull pain.  He is interested in getting his prostate level checked.  He continues to have pain in his lower abdomen and pelvis.  Bowel movements have been normal, no diarrhea or blood in the stool.    Patient does have joint pain in both of his hands.  Mostly in the PIP joints of both hands.          Objective     History reviewed. No pertinent past medical history.  History reviewed. No pertinent surgical history.  History reviewed. No pertinent family history.    Vital Signs  /70 (BP Location: Right arm, Patient Position: Sitting, Cuff Size: Adult)   Pulse 78   Temp 98 °F (36.7 °C) (Infrared)   Ht 165.1 cm (65\")   Wt 66.7 kg (147 lb)   SpO2 96%   BMI 24.46 kg/m²   Estimated body mass index is 25.23 kg/m² as calculated from the following:    Height as of 25: 165.1 cm (65\").    Weight as of 25: 68.8 kg (151 lb 9.6 oz).    Physical Exam  Vitals reviewed.   Constitutional:       Appearance: Normal appearance.   Cardiovascular:      Rate and Rhythm: Normal rate.   Pulmonary:      Effort: Pulmonary effort is normal.   Abdominal:      General: Abdomen is flat.      Palpations: Abdomen is soft.      Comments: Tender to palpation right lower quadrant and left lower quadrant as well as pelvic area   Musculoskeletal:      Comments: Tender to palpation along low " back   Neurological:      Mental Status: He is alert.                   Assessment and Plan     Diagnoses and all orders for this visit:    1. Chronic midline low back pain, unspecified whether sciatica present (Primary)  -     XR Spine Lumbar 2 or 3 View (In Office)    2. Abdominal pain, unspecified abdominal location  -     Comprehensive metabolic panel; Future  -     CBC & Differential; Future  -     CT Abdomen Pelvis Without Contrast; Future    3. Urinary frequency  -     Urinalysis With Microscopic - Urine, Clean Catch; Future    4. Polyarthralgia  -     C-reactive protein  -     SONNY  -     Rheumatoid Factor; Future    5. Encounter for prostate cancer screening  -     PSA SCREENING; Future      Please suspect the patient's low back pain could be associated with arthritis and muscle strain.  Ibuprofen is helpful, so I did recommend that he take that as needed.  We are getting an x-ray for further evaluation of his low back today.  Given his age, there is concern for possible prostate cancer with metastases to the back.  I am getting a PSA level today.    He does complain of concurrent lower abdominal and pelvic pain.  Previous urinalysis was negative for infection.  Repeating urinalysis today.  Obtaining CMP and CBC as well given his abdominal pain.  Obtaining CT abdomen and pelvis for further evaluation as well.    Patient does have joint deformities in the PIP joints of all fingers, most prominent in the third digit.  Obtaining rheumatology workup.  Will follow-up on those results and advise further.  Ibuprofen for pain control in the meantime.    Follow Up  No follow-ups on file.    Marlena Mccall MD

## 2025-07-23 LAB — ANA SER QL: NEGATIVE

## 2025-07-28 ENCOUNTER — LAB (OUTPATIENT)
Dept: LAB | Facility: HOSPITAL | Age: 85
End: 2025-07-28
Payer: MEDICARE

## 2025-07-28 DIAGNOSIS — R35.0 URINARY FREQUENCY: ICD-10-CM

## 2025-07-28 LAB
BACTERIA UR QL AUTO: NORMAL /HPF
BILIRUB UR QL STRIP: NEGATIVE
CLARITY UR: CLEAR
COLOR UR: YELLOW
GLUCOSE UR STRIP-MCNC: NEGATIVE MG/DL
HGB UR QL STRIP.AUTO: NEGATIVE
HYALINE CASTS UR QL AUTO: NORMAL /LPF
KETONES UR QL STRIP: NEGATIVE
LEUKOCYTE ESTERASE UR QL STRIP.AUTO: NEGATIVE
NITRITE UR QL STRIP: NEGATIVE
PH UR STRIP.AUTO: 7 [PH] (ref 5–8)
PROT UR QL STRIP: NEGATIVE
RBC # UR STRIP: NORMAL /HPF
REF LAB TEST METHOD: NORMAL
SP GR UR STRIP: 1.01 (ref 1–1.03)
SQUAMOUS #/AREA URNS HPF: NORMAL /HPF
UROBILINOGEN UR QL STRIP: NORMAL
WBC # UR STRIP: NORMAL /HPF

## 2025-07-28 PROCEDURE — 81001 URINALYSIS AUTO W/SCOPE: CPT

## 2025-08-20 ENCOUNTER — HOSPITAL ENCOUNTER (OUTPATIENT)
Dept: CT IMAGING | Facility: HOSPITAL | Age: 85
Discharge: HOME | End: 2025-08-20
Admitting: STUDENT IN AN ORGANIZED HEALTH CARE EDUCATION/TRAINING PROGRAM
Payer: MEDICARE

## 2025-08-20 DIAGNOSIS — R10.9 ABDOMINAL PAIN, UNSPECIFIED ABDOMINAL LOCATION: ICD-10-CM

## 2025-08-20 PROCEDURE — 74176 CT ABD & PELVIS W/O CONTRAST: CPT
